# Patient Record
Sex: MALE | HISPANIC OR LATINO
[De-identification: names, ages, dates, MRNs, and addresses within clinical notes are randomized per-mention and may not be internally consistent; named-entity substitution may affect disease eponyms.]

---

## 2024-03-08 PROBLEM — Z00.00 ENCOUNTER FOR PREVENTIVE HEALTH EXAMINATION: Status: ACTIVE | Noted: 2024-03-08

## 2024-03-15 ENCOUNTER — APPOINTMENT (OUTPATIENT)
Dept: COLORECTAL SURGERY | Facility: CLINIC | Age: 59
End: 2024-03-15
Payer: COMMERCIAL

## 2024-03-15 VITALS
HEART RATE: 71 BPM | HEIGHT: 68.5 IN | TEMPERATURE: 98 F | BODY MASS INDEX: 28.62 KG/M2 | SYSTOLIC BLOOD PRESSURE: 126 MMHG | WEIGHT: 191 LBS | DIASTOLIC BLOOD PRESSURE: 74 MMHG | OXYGEN SATURATION: 97 %

## 2024-03-15 DIAGNOSIS — R85.619 UNSPECIFIED ABNORMAL CYTOLOGICAL FINDINGS IN SPECIMENS FROM ANUS: ICD-10-CM

## 2024-03-15 DIAGNOSIS — K62.82 DYSPLASIA OF ANUS: ICD-10-CM

## 2024-03-15 PROCEDURE — 46600 DIAGNOSTIC ANOSCOPY SPX: CPT

## 2024-03-15 PROCEDURE — 99202 OFFICE O/P NEW SF 15 MIN: CPT | Mod: 25

## 2024-03-15 NOTE — HISTORY OF PRESENT ILLNESS
[FreeTextEntry1] : 59 y/o Malagasy Speaking M presents for Anal Pap  referred by Dr. Oscar Rodriguez PCP  PMH: HIV, Diabetes PSH: Denies FH: CRC/IBD Meds: Biktarvy, Metformin Allergies: NKDA  Never done Colonoscopy, has done Fecal Occult yearly with PCP as per patient negative   Outside labs reviewed: Anal Pap 09/11/2023 noted HPV+, negative for HPV 16, 18  Patient presents for abnormal pap done by PCP 09/2023, patient reports that previous Paps have been normal.  Denies any hx of genital warts.   hx of anal receptive sex but has not done it in many years. MSM currently not active  BM: 1x a day fully formed, denies any blood in stool.   HIV+ on Biktarvy, states labs done yesterday, usually undetectable  Denies ASA/NSAIDS in the last 7 days.

## 2024-03-15 NOTE — PHYSICAL EXAM
[Excoriation] : no perianal excoriation [Normal] : was normal [None] : there was no rectal mass  [de-identified] : Anal pap performed [FreeTextEntry1] : Medical assistant was present for the entire exam.  Anoscopy was performed for evaluation of the patients rectal bleeding  history . The risks, benefits and alternatives were reviewed.  A lighted anoscope was passed into the anal canal and the entire anal mucosal surface was inspected..   The findings revealed moderate internal hemorrhoids. No masses or lesions were identified.

## 2024-03-15 NOTE — ASSESSMENT
[FreeTextEntry1] : I have reviewed with the patient the clinical and natural history of human papilloma virus and its relationship to the anus. The risks and associated consequences including sexual transmission, anal warts, anal dysplasia, and the risk of anal cancer have been outlined. The need for long-term surveillance and followup has been detailed. The treatment options including high resolution anoscopy and its associated risk of recurrence and post procedure stricture and pain compared to continued close surveillance and monitoring were reviewed. The patient wishes to proceed with surveillance and management of identified visible/palpable lesions as identified or high grade ( HGSIL) dysplasia identified on cytology.  A  was utilized throughout the visit.  All questions were answered and explained.

## 2024-03-20 ENCOUNTER — NON-APPOINTMENT (OUTPATIENT)
Age: 59
End: 2024-03-20

## 2024-03-20 LAB — ANAL PAP CYTOLOGY: NORMAL
